# Patient Record
Sex: FEMALE | URBAN - METROPOLITAN AREA
[De-identification: names, ages, dates, MRNs, and addresses within clinical notes are randomized per-mention and may not be internally consistent; named-entity substitution may affect disease eponyms.]

---

## 2020-02-17 ENCOUNTER — VIRTUAL VISIT (OUTPATIENT)
Dept: FAMILY MEDICINE | Facility: OTHER | Age: 55
End: 2020-02-17

## 2020-02-17 NOTE — PROGRESS NOTES
"Date: 2020 11:37:11  Clinician: Dimitri Artis  Clinician NPI: [id number]  Patient: Rita Lewis  Patient : 1965  Patient Address: [address] [place] [address]  Patient Phone: [phone]  Visit Protocol: General skin conditions  Patient Summary:  Rita is a 55 year old ( : 1965 ) female who initiated a Visit for evaluation of an unspecified skin condition. When asked the question \"Please sign me up to receive news, health information and promotions from [employer].\", Rita responded \"No\".    Images of her skin condition were uploaded.  Her symptoms started 3-4 weeks ago and affect the right side of her body. The skin condition is located on her hands. The skin condition is red in color.   The affected area has drainage. It feels tender to touch, painful, burning, and warm to touch. The symptoms do not interfere with her sleep.   Symptom details     Redness: The redness has not rapidly increased in size.    Drainage: The color of the drainage is white, yellow, and green.    Pain: The pain is mild (between 1-3 on a 10 point pain scale).     The skin condition has changed since the symptoms started. Description of changes as reported by the patient (free text): I think 2 of my cuticles are infected. It has come and gone over the last month and has worsened overnight. The tip of my index finger is very red and swollen and now very sensitive to touch on the front and back of the fingertip. Drainage of yellow/green/white pus. Very warm to the touch and very sensitive in front and back   Denied symptoms include blisters, hives, scabs, pimples, numbness, dry/flaky skin, sores, itchiness, and crusts. Rita does not feel feverish. She does not have a rash in the shape of a bull's-eye.   Treatments or home remedies used to relieve the symptoms as reported by the patient (free text): Soaking in epsom salt and warm water. Antibiotic ointment. Squeezing area to drain  pus and relieve " pressure.   Precipitating events   Rita did not come in contact with any irritants prior to the onset of her symptoms and has not been in close contact with anyone that has similar symptoms. She also did not spend time in a wooded area, swim, travel, or spend excess time in the sun just before her symptoms started. Rita did not get bitten or stung by an insect.   Pertinent medical history  Rita has experienced this skin condition before. Her current skin condition comes and goes. The last time she experienced this skin condition was within the last 3 months.   Rita has had chickenpox, but has not had shingles in the past. She has not received a shingles vaccine.    Rita does not have a history or a family history of atopia. Ongoing medical conditions were denied.   Weight: 135 lbs   Rita does not smoke or use smokeless tobacco.   Additional information as reported by the patient (free text): I think my cuticles are infected as the result of a manicure   Weight: 135 lbs    MEDICATIONS: progesterone micronized oral, ALLERGIES: Sulfa (Sulfonamide Antibiotics)  Clinician Response:  Dear Rita    Paronychia    Diagnosis: Cellulitis of unspecified finger  Diagnosis ICD: L03.019  Prescription: cephalexin (Keflex) 500 mg oral capsule 30 capsule, 10 days supply. Take 1 capsule by mouth every 8 hours for 10 days. Refills: 0, Refill as needed: no, Allow substitutions: yes  Pharmacy: [id number] - [phone] - [address], [place] [address]

## 2021-01-08 ENCOUNTER — HOSPITAL PATHOLOGY (OUTPATIENT)
Dept: OTHER | Facility: CLINIC | Age: 56
End: 2021-01-08

## 2021-01-11 LAB — COPATH REPORT: NORMAL

## 2025-02-26 PROCEDURE — 88305 TISSUE EXAM BY PATHOLOGIST: CPT | Mod: TC,ORL | Performed by: OBSTETRICS & GYNECOLOGY

## 2025-02-26 PROCEDURE — 88305 TISSUE EXAM BY PATHOLOGIST: CPT | Mod: 26 | Performed by: PATHOLOGY

## 2025-02-27 ENCOUNTER — LAB REQUISITION (OUTPATIENT)
Dept: LAB | Facility: CLINIC | Age: 60
End: 2025-02-27
Payer: COMMERCIAL

## 2025-03-03 LAB
PATH REPORT.COMMENTS IMP SPEC: NORMAL
PATH REPORT.COMMENTS IMP SPEC: NORMAL
PATH REPORT.FINAL DX SPEC: NORMAL
PATH REPORT.GROSS SPEC: NORMAL
PATH REPORT.MICROSCOPIC SPEC OTHER STN: NORMAL
PATH REPORT.RELEVANT HX SPEC: NORMAL
PHOTO IMAGE: NORMAL